# Patient Record
Sex: FEMALE | Race: WHITE | NOT HISPANIC OR LATINO | Employment: FULL TIME | URBAN - METROPOLITAN AREA
[De-identification: names, ages, dates, MRNs, and addresses within clinical notes are randomized per-mention and may not be internally consistent; named-entity substitution may affect disease eponyms.]

---

## 2023-08-23 ENCOUNTER — OFFICE VISIT (OUTPATIENT)
Dept: CARDIOLOGY | Facility: CLINIC | Age: 38
End: 2023-08-23
Payer: COMMERCIAL

## 2023-08-23 VITALS
DIASTOLIC BLOOD PRESSURE: 62 MMHG | OXYGEN SATURATION: 98 % | HEART RATE: 69 BPM | BODY MASS INDEX: 28.62 KG/M2 | HEIGHT: 66 IN | WEIGHT: 178.1 LBS | SYSTOLIC BLOOD PRESSURE: 92 MMHG

## 2023-08-23 DIAGNOSIS — R94.31 ABNORMAL EKG: Primary | ICD-10-CM

## 2023-08-23 PROCEDURE — 93000 ELECTROCARDIOGRAM COMPLETE: CPT | Performed by: INTERNAL MEDICINE

## 2023-08-23 PROCEDURE — 99203 OFFICE O/P NEW LOW 30 MIN: CPT | Performed by: INTERNAL MEDICINE

## 2023-08-23 RX ORDER — PAROXETINE HYDROCHLORIDE 40 MG/1
1 TABLET, FILM COATED ORAL DAILY
COMMUNITY
Start: 2023-05-16

## 2023-08-23 RX ORDER — BUPROPION HYDROCHLORIDE 300 MG/1
300 TABLET ORAL EVERY MORNING
COMMUNITY
Start: 2023-08-19

## 2023-08-23 RX ORDER — PROPRANOLOL HYDROCHLORIDE 20 MG/1
20 TABLET ORAL AS NEEDED
COMMUNITY
Start: 2023-07-31

## 2023-08-23 NOTE — PROGRESS NOTES
PATIENTINFORMATION    Date of Office Visit: 2023  Encounter Provider: Vamshi Dickens MD  Place of Service: [facility] CARDIOLOGY  Patient Name: Chacha Lawrence  : 1985    Subjective:     Encounter Date:2023      Patient ID: Chacha Lawrence is a 37 y.o. female.    Chief Complaint   Patient presents with    Abnormal ECG     Clearance for Vyvanse w Liz Cabrales NP     HPI  Ms. Lawrence is a pleasant 37 years old lady who came to cardiology clinic for evaluation of abnormal EKG and cardiology clearance before starting ADHD medication Vyvanse.  She had a bariatric surgery in  with gastric sleeve and lost more than 120 pounds.  Currently she exercises on elliptical at least 3 times weekly for about an hour without any limiting symptoms.  She denies any chest pain, palpitations, presyncope syncope, orthopnea, PND or extremity swelling or prior diagnosis of cardiovascular illness.  She had several cardiac tests before back surgery in .  She denies any tobacco use, recreational drug use or alcohol abuse.      ROS  All systems reviewed and negative except as noted in HPI.    Past Medical History:   Diagnosis Date    ADHD     Major depressive disorder        Past Surgical History:   Procedure Laterality Date     SECTION      GASTRIC SLEEVE LAPAROSCOPIC         Social History     Socioeconomic History    Marital status:     Number of children: 3   Tobacco Use    Smoking status: Never    Tobacco comments:     Caffeine use   Vaping Use    Vaping Use: Every day    Substances: THC   Substance and Sexual Activity    Alcohol use: Yes    Drug use: Yes     Types: Marijuana       Family History   Problem Relation Age of Onset    Hypertension Maternal Grandmother     Lung cancer Maternal Grandmother     Hypertension Maternal Grandfather            ECG 12 Lead    Date/Time: 2023 1:33 PM  Performed by: Vamshi Dickens MD  Authorized by: Vamshi Dickens MD  "  Comparison: compared with previous ECG from 8/2/2023  Similar to previous ECG  Rhythm: sinus rhythm  Rate: normal  Conduction: conduction normal  ST Segments: ST segments normal  T Waves: T waves normal  QRS axis: normal  Other: no other findings    Clinical impression: normal ECG           Objective:     BP 92/62 (BP Location: Left arm, Patient Position: Sitting, Cuff Size: Adult)   Pulse 69   Ht 167.6 cm (66\")   Wt 80.8 kg (178 lb 1.6 oz)   SpO2 98%   BMI 28.75 kg/mý  Body mass index is 28.75 kg/mý.     Constitutional:       General: Not in acute distress.     Appearance: Well-developed. Not diaphoretic.   Eyes:      Pupils: Pupils are equal, round, and reactive to light.   HENT:      Head: Normocephalic and atraumatic.   Neck:      Thyroid: No thyromegaly.   Pulmonary:      Effort: Pulmonary effort is normal. No respiratory distress.      Breath sounds: Normal breath sounds. No wheezing. No rales.   Chest:      Chest wall: Not tender to palpatation.   Cardiovascular:      Normal rate. Regular rhythm.      No gallop.    Pulses:     Intact distal pulses.   Edema:     Peripheral edema absent.   Abdominal:      General: Bowel sounds are normal. There is no distension.      Palpations: Abdomen is soft.      Tenderness: There is no guarding.   Musculoskeletal: Normal range of motion.         General: No deformity.      Cervical back: Normal range of motion and neck supple. Skin:     General: Skin is warm and dry.      Findings: No rash.   Neurological:      Mental Status: Alert and oriented to person, place, and time.      Cranial Nerves: No cranial nerve deficit.      Deep Tendon Reflexes: Reflexes are normal and symmetric.   Psychiatric:         Judgment: Judgment normal.       Review Of Data: I have reviewed pertinent recent labs, images and documents and pertinent findings included in HPI or assessment below.          Assessment/Plan:         Abnormal EKG from outside facility-I have reviewed EKG from " 8/2/2023-reversal lead on aVR otherwise essentially normal.  Repeat EKG in office today is also essentially normal.  She does not have any evidence for arrhythmia, heart block.  No cardiac related symptoms and she exercise regularly without limiting symptoms.  I do not see any contraindications for not using Vyvanse if it is medically necessary.  Monitor for any new symptoms while on Vyvanse.  ADHD /anxiety/depression on treatment  prior history of obesity status post gastric sleeve with successful weight loss.        Diagnosis and plan of care discussed with patient and verbalized understanding.            Your medication list            Accurate as of August 23, 2023  1:35 PM. If you have any questions, ask your nurse or doctor.                CONTINUE taking these medications        Instructions Last Dose Given Next Dose Due   buPROPion  MG 24 hr tablet  Commonly known as: WELLBUTRIN XL      Take 1 tablet by mouth Every Morning.       PARoxetine 40 MG tablet  Commonly known as: PAXIL      Take 1 tablet by mouth Daily.       propranolol 20 MG tablet  Commonly known as: INDERAL      Take 1 tablet by mouth As Needed.                    Vamshi Dikcens MD  08/23/23  13:35 EDT

## 2023-08-29 ENCOUNTER — TELEPHONE (OUTPATIENT)
Dept: CARDIOLOGY | Facility: CLINIC | Age: 38
End: 2023-08-29

## 2023-08-29 NOTE — TELEPHONE ENCOUNTER
The [facility] received a fax that requires your attention. The document has been indexed to the patient’s chart for your review.      Reason for sending: EXTERNAL MEDICAL RECORD NOTIFICATION     Documents Description: PHYS ORD-[facility] CARDIAC CLEARANCE REQ-[id number]    Name of Sender: [facility]     Date Indexed: 8.28.23    NOTE:   PT HAD APPOINTMENT RECENTLY, OFFICE IS REQUESTING LETTER FROM CARDIOLOGIST STATING PT CAN START STIMULANT MEDICATION

## 2023-09-01 NOTE — PROGRESS NOTES
It is okay to start stimulant medication for Chacha Lawrence from cardiology standpoint.  There are no cardiac contraindications to use a stimulant medication for Chacha.  Let me know if you have any questions.  Thank you